# Patient Record
Sex: MALE | Race: WHITE | NOT HISPANIC OR LATINO | Employment: FULL TIME | URBAN - METROPOLITAN AREA
[De-identification: names, ages, dates, MRNs, and addresses within clinical notes are randomized per-mention and may not be internally consistent; named-entity substitution may affect disease eponyms.]

---

## 2023-08-30 PROBLEM — E78.00 HYPERCHOLESTEROLEMIA: Status: ACTIVE | Noted: 2022-08-15

## 2023-08-30 PROBLEM — I10 HYPERTENSION: Status: ACTIVE | Noted: 2023-08-30

## 2023-08-30 PROBLEM — E11.9 DIABETES (MULTI): Status: ACTIVE | Noted: 2022-08-15

## 2023-08-30 PROBLEM — F10.10 EXCESSIVE CONSUMPTION OF ETHANOL: Status: ACTIVE | Noted: 2023-08-30

## 2023-08-30 PROBLEM — E10.65 TYPE 1 DIABETES MELLITUS WITH HYPERGLYCEMIA (MULTI): Status: ACTIVE | Noted: 2023-08-30

## 2023-08-30 RX ORDER — INSULIN ASPART 100 [IU]/ML
INJECTION, SOLUTION INTRAVENOUS; SUBCUTANEOUS
COMMUNITY
Start: 2015-08-06 | End: 2024-01-25 | Stop reason: WASHOUT

## 2023-08-30 RX ORDER — INSULIN DEGLUDEC 100 U/ML
INJECTION, SOLUTION SUBCUTANEOUS
COMMUNITY
Start: 2022-11-18 | End: 2023-10-13

## 2023-08-30 RX ORDER — BLOOD-GLUCOSE METER
EACH MISCELLANEOUS
COMMUNITY
Start: 2023-01-12

## 2023-08-30 RX ORDER — INSULIN LISPRO 100 [IU]/ML
INJECTION, SOLUTION INTRAVENOUS; SUBCUTANEOUS
COMMUNITY
End: 2024-06-04

## 2023-08-30 RX ORDER — ATORVASTATIN CALCIUM 10 MG/1
TABLET, FILM COATED ORAL
COMMUNITY
Start: 2023-02-24 | End: 2023-11-07

## 2023-08-30 RX ORDER — LISINOPRIL 10 MG/1
TABLET ORAL
COMMUNITY
Start: 2023-01-12

## 2023-10-13 DIAGNOSIS — E10.65 TYPE 1 DIABETES MELLITUS WITH HYPERGLYCEMIA (MULTI): Primary | ICD-10-CM

## 2023-10-13 RX ORDER — INSULIN DEGLUDEC 100 U/ML
INJECTION, SOLUTION SUBCUTANEOUS
Qty: 30 ML | Refills: 3 | Status: SHIPPED | OUTPATIENT
Start: 2023-10-13

## 2023-11-07 DIAGNOSIS — E78.00 HYPERCHOLESTEROLEMIA: Primary | ICD-10-CM

## 2023-11-07 RX ORDER — ATORVASTATIN CALCIUM 10 MG/1
10 TABLET, FILM COATED ORAL DAILY
Qty: 90 TABLET | Refills: 1 | Status: SHIPPED | OUTPATIENT
Start: 2023-11-07 | End: 2024-05-13

## 2024-01-07 DIAGNOSIS — E10.65 TYPE 1 DIABETES MELLITUS WITH HYPERGLYCEMIA (MULTI): ICD-10-CM

## 2024-01-08 RX ORDER — BLOOD-GLUCOSE SENSOR
EACH MISCELLANEOUS
Qty: 2 EACH | Refills: 11 | Status: SHIPPED | OUTPATIENT
Start: 2024-01-08

## 2024-01-25 ENCOUNTER — OFFICE VISIT (OUTPATIENT)
Dept: ENDOCRINOLOGY | Facility: CLINIC | Age: 54
End: 2024-01-25
Payer: COMMERCIAL

## 2024-01-25 VITALS
WEIGHT: 186 LBS | BODY MASS INDEX: 25.94 KG/M2 | HEART RATE: 82 BPM | SYSTOLIC BLOOD PRESSURE: 137 MMHG | DIASTOLIC BLOOD PRESSURE: 90 MMHG

## 2024-01-25 DIAGNOSIS — E78.00 HYPERCHOLESTEROLEMIA: Primary | ICD-10-CM

## 2024-01-25 DIAGNOSIS — E10.65 TYPE 1 DIABETES MELLITUS WITH HYPERGLYCEMIA (MULTI): ICD-10-CM

## 2024-01-25 DIAGNOSIS — R53.83 OTHER FATIGUE: ICD-10-CM

## 2024-01-25 DIAGNOSIS — I10 PRIMARY HYPERTENSION: ICD-10-CM

## 2024-01-25 LAB — POC HEMOGLOBIN A1C: 8.2 % (ref 4.2–6.5)

## 2024-01-25 PROCEDURE — 99214 OFFICE O/P EST MOD 30 MIN: CPT | Performed by: NURSE PRACTITIONER

## 2024-01-25 PROCEDURE — 95251 CONT GLUC MNTR ANALYSIS I&R: CPT | Performed by: NURSE PRACTITIONER

## 2024-01-25 PROCEDURE — 1036F TOBACCO NON-USER: CPT | Performed by: NURSE PRACTITIONER

## 2024-01-25 PROCEDURE — 4010F ACE/ARB THERAPY RXD/TAKEN: CPT | Performed by: NURSE PRACTITIONER

## 2024-01-25 PROCEDURE — 3080F DIAST BP >= 90 MM HG: CPT | Performed by: NURSE PRACTITIONER

## 2024-01-25 PROCEDURE — 83036 HEMOGLOBIN GLYCOSYLATED A1C: CPT | Performed by: NURSE PRACTITIONER

## 2024-01-25 PROCEDURE — 3075F SYST BP GE 130 - 139MM HG: CPT | Performed by: NURSE PRACTITIONER

## 2024-01-25 ASSESSMENT — PATIENT HEALTH QUESTIONNAIRE - PHQ9
1. LITTLE INTEREST OR PLEASURE IN DOING THINGS: NOT AT ALL
2. FEELING DOWN, DEPRESSED OR HOPELESS: NOT AT ALL
SUM OF ALL RESPONSES TO PHQ9 QUESTIONS 1 AND 2: 0

## 2024-01-25 ASSESSMENT — ENCOUNTER SYMPTOMS
DEPRESSION: 0
OCCASIONAL FEELINGS OF UNSTEADINESS: 0
LOSS OF SENSATION IN FEET: 0

## 2024-01-25 ASSESSMENT — PAIN SCALES - GENERAL: PAINLEVEL: 0-NO PAIN

## 2024-01-25 NOTE — PATIENT INSTRUCTIONS
INCREASE TRESIBA 20 UNITS    ICR 1:8 FROM 1:10     FASTING LABS IF YOU DECIDE TO GET PUMP NEED DONE

## 2024-01-25 NOTE — PROGRESS NOTES
HPI   Presents for follow up/metabolic management 52 yo with DM Type 1 diagnosed age 39. History HTN, HLD. Current A1C 8.2%. Patient testing sugars 6 times day. No lows. Following carb controlled diet somewhat and know reasonable carb allowances. Patient able to afford their medications. Patient is exercising.    -CGM [place] with phone janessa. Currently on insulin checking BS at least 4 xs a day adjustments made based on readings/frequent visits to manage.  -INSULIN Tresiba 18 units increased to 20 this visit Humalog ICR 1:10 changed 1:8 ISF 30 >130  -HTN Lisinopril 10 mg daily at goal  -STATIN Atorvastatin 10 mg LDLCALC 74     [place] report downloaded and attached time in target 40%, 0% lows, avg bs 211. Roller coaster type pattern.       Current Outpatient Medications:     atorvastatin (Lipitor) 10 mg tablet, TAKE 1 TABLET BY MOUTH EVERY DAY, Disp: 90 tablet, Rfl: 1    blood sugar diagnostic (OneTouch Verio test strips) strip, as directed to check blood sugar daily In Vitro for 90 days, Disp: , Rfl:     blood-glucose sensor (Cuffed and WantedStyle [place] 3 Sensor) device, AS DIRECTED CHANGE SENSOR EVERY 14 DAYS 28 DAYS, Disp: 2 each, Rfl: 11    insulin degludec (Tresiba FlexTouch U-100) 100 unit/mL (3 mL) injection, AS DIRECTED, START 18 UNITS DAILY SUBCUTANEOUS 2 BOXES 90 DAYS, Disp: 30 mL, Rfl: 3    insulin lispro (HumaLOG) 100 unit/mL injection, USE AS DIRECTED THREE TIMES DAILY UP TO 30 UNITS DAILY for 100, Disp: , Rfl:     insulin lispro (HumaLOG) 100 UNIT/ML patient supplied pump, 0 Refill(s), Type: Maintenance, Disp: , Rfl:     lisinopril 10 mg tablet, 1 tablet Orally Once a day for 90 days, Disp: , Rfl:     insulin aspart (NovoLOG) 100 unit/mL injection, NovoLOG 100 UNIT/ML SOLN  Refills: 0     Rommel aTylor MD   Start : 6-Aug-2015  Active, Disp: , Rfl:       Allergies as of 01/25/2024 - Reviewed 01/25/2024   Allergen Reaction Noted    Penicillins Rash 08/30/2023         Review of Systems   Cardiology:  Lightheadedness-denies.  Chest pain-denies.  Leg edema-denies.  Palpitations-denies.  Respiratory: Cough-denies. Shortness of breath-denies.  Wheezing-denies.  Gastroenterology: Constipation-denies.  Diarrhea-denies.  Heartburn-denies.  Endocrinology: Cold intolerance-denies.  Heat intolerance-denies.  Sweats-denies.  Neurology: Headache-denies.  Tremor-denies.  Neuropathy in extremities-denies.  Psychology: Low energy-denies.  Irritability-denies.  Sleep disturbances-denies.      /90 (BP Location: Right arm, Patient Position: Sitting)   Pulse 82   Wt 84.4 kg (186 lb)   BMI 25.94 kg/m²       Labs:  Lab Results   Component Value Date    WBC 5.5 05/20/2022    NRBC 0 05/20/2022    RBC 4.45 (L) 05/20/2022    HGB 13.9 05/20/2022    HCT 39.9 (L) 05/20/2022     05/20/2022     Lab Results   Component Value Date    CALCIUM 9.6 02/02/2023    AST 21 05/20/2022    ALKPHOS 73 05/20/2022    BILITOT 0.6 05/20/2022    PROT 6.6 05/20/2022    ALBUMIN 4.4 05/20/2022    GLOB 2.2 05/20/2022    AGR 2.0 05/20/2022     02/02/2023    K 4.6 02/02/2023    CL 97 02/02/2023    CO2 26 02/02/2023    ANIONGAP 14 02/02/2023    BUN 16 02/02/2023    CREATININE 0.7 02/02/2023    UREACREAUR 22.9 (H) 02/02/2023    GLUCOSE 242 (H) 02/02/2023    ALT 16 05/20/2022    EGFR 110 02/02/2023     Lab Results   Component Value Date    CHOL 192 05/20/2022    TRIG 145 05/20/2022    HDL 89 05/20/2022    LDLCALC 74 05/20/2022     Lab Results   Component Value Date    MICROALBCREA 58.3 (H) 05/20/2022       Lab Results   Component Value Date    HGBA1C 8.2 (A) 01/25/2024         Physical Exam   General Appearance: pleasant, cooperative, no acute distress  HEENT: no chemosis, no proptosis, no lid lag, no lid retraction  Neck: no lymphadenopathy, no thyromegaly, no dominant thyroid nodules  Heart: no murmurs, regular rate and rhythm, S1 and S2  Lungs: no wheezes, no rhonci, no rales  Extremities: no lower extremity swelling      Assessment/Plan   1.  Type 1 diabetes mellitus with hyperglycemia (CMS/[facility])  -will call Tadeo Georges regarding insulin pump  -ICR changed 1:8  -basal increased 20 units daily  -reviewed target bs    2. Hypercholesterolemia  -LDL target < 70  -due for fasting lipid  -tolerates statin    3. Primary hypertension  -at target     Follow Up: 3 months CNP    -labs/tests/notes reviewed  -reviewed and counseled patient on medication monitoring and side effects    Medical Decision Making  Complexity of problem: Chronic illness of diabetes mellitus uncontrolled, progressing  Data analyzed and reviewed: Reviewed prior notes, blood glucose data, labs including HgbA1c, lipids, serum chemistries.  Ordered tests.   Risk of complications and morbidities: Is definite because of use of insulin and risk of hypoglycemia.  Prescription medications reviewed and modifications made.  Compliance assessed.  Addressed social determinants of health including food insecurity.

## 2024-04-30 ENCOUNTER — APPOINTMENT (OUTPATIENT)
Dept: ENDOCRINOLOGY | Facility: CLINIC | Age: 54
End: 2024-04-30
Payer: COMMERCIAL

## 2024-05-11 DIAGNOSIS — E78.00 HYPERCHOLESTEROLEMIA: ICD-10-CM

## 2024-05-13 RX ORDER — ATORVASTATIN CALCIUM 10 MG/1
10 TABLET, FILM COATED ORAL DAILY
Qty: 90 TABLET | Refills: 1 | Status: SHIPPED | OUTPATIENT
Start: 2024-05-13

## 2024-06-03 ENCOUNTER — APPOINTMENT (OUTPATIENT)
Dept: ENDOCRINOLOGY | Facility: CLINIC | Age: 54
End: 2024-06-03
Payer: COMMERCIAL

## 2024-06-04 DIAGNOSIS — E10.65 TYPE 1 DIABETES MELLITUS WITH HYPERGLYCEMIA (MULTI): Primary | ICD-10-CM

## 2024-06-04 RX ORDER — INSULIN LISPRO 100 [IU]/ML
INJECTION, SOLUTION INTRAVENOUS; SUBCUTANEOUS
Qty: 60 ML | Refills: 3 | Status: SHIPPED | OUTPATIENT
Start: 2024-06-04

## 2024-06-08 DIAGNOSIS — E10.65 TYPE 1 DIABETES MELLITUS WITH HYPERGLYCEMIA (MULTI): ICD-10-CM

## 2024-06-08 RX ORDER — INSULIN DEGLUDEC 100 U/ML
INJECTION, SOLUTION SUBCUTANEOUS
Refills: 0 | Status: CANCELLED | OUTPATIENT
Start: 2024-06-08

## 2024-06-10 RX ORDER — INSULIN DEGLUDEC 200 U/ML
INJECTION, SOLUTION SUBCUTANEOUS
Qty: 18 ML | Refills: 3 | Status: SHIPPED | OUTPATIENT
Start: 2024-06-10

## 2024-06-20 DIAGNOSIS — E10.65 TYPE 1 DIABETES MELLITUS WITH HYPERGLYCEMIA (MULTI): ICD-10-CM

## 2024-06-20 RX ORDER — BLOOD-GLUCOSE SENSOR
EACH MISCELLANEOUS
Qty: 6 EACH | Refills: 3 | Status: SHIPPED | OUTPATIENT
Start: 2024-06-20

## 2024-07-02 ENCOUNTER — LAB (OUTPATIENT)
Dept: LAB | Facility: LAB | Age: 54
End: 2024-07-02
Payer: COMMERCIAL

## 2024-07-02 DIAGNOSIS — E10.65 TYPE 1 DIABETES MELLITUS WITH HYPERGLYCEMIA (MULTI): ICD-10-CM

## 2024-07-02 DIAGNOSIS — R53.83 OTHER FATIGUE: ICD-10-CM

## 2024-07-02 DIAGNOSIS — E78.00 HYPERCHOLESTEROLEMIA: ICD-10-CM

## 2024-07-02 LAB
ALBUMIN SERPL-MCNC: 4.6 G/DL (ref 3.5–5)
ALP BLD-CCNC: 64 U/L (ref 35–125)
ALT SERPL-CCNC: 18 U/L (ref 5–40)
ANION GAP SERPL CALC-SCNC: 13 MMOL/L
AST SERPL-CCNC: 22 U/L (ref 5–40)
BILIRUB SERPL-MCNC: 1 MG/DL (ref 0.1–1.2)
BUN SERPL-MCNC: 9 MG/DL (ref 8–25)
CALCIUM SERPL-MCNC: 9.7 MG/DL (ref 8.5–10.4)
CHLORIDE SERPL-SCNC: 99 MMOL/L (ref 97–107)
CHOLEST SERPL-MCNC: 220 MG/DL (ref 133–200)
CHOLEST/HDLC SERPL: 2.1 {RATIO}
CO2 SERPL-SCNC: 26 MMOL/L (ref 24–31)
CREAT SERPL-MCNC: 0.8 MG/DL (ref 0.4–1.6)
EGFRCR SERPLBLD CKD-EPI 2021: >90 ML/MIN/1.73M*2
GLUCOSE SERPL-MCNC: 170 MG/DL (ref 65–99)
HDLC SERPL-MCNC: 107 MG/DL
LDLC SERPL CALC-MCNC: 101 MG/DL (ref 65–130)
POTASSIUM SERPL-SCNC: 4.9 MMOL/L (ref 3.4–5.1)
PROT SERPL-MCNC: 7.3 G/DL (ref 5.9–7.9)
SODIUM SERPL-SCNC: 138 MMOL/L (ref 133–145)
TRIGL SERPL-MCNC: 62 MG/DL (ref 40–150)
TSH SERPL DL<=0.05 MIU/L-ACNC: 1.58 MIU/L (ref 0.27–4.2)

## 2024-07-02 PROCEDURE — 84681 ASSAY OF C-PEPTIDE: CPT

## 2024-07-02 PROCEDURE — 80053 COMPREHEN METABOLIC PANEL: CPT

## 2024-07-02 PROCEDURE — 36415 COLL VENOUS BLD VENIPUNCTURE: CPT

## 2024-07-02 PROCEDURE — 84443 ASSAY THYROID STIM HORMONE: CPT

## 2024-07-02 PROCEDURE — 80061 LIPID PANEL: CPT

## 2024-07-03 ENCOUNTER — APPOINTMENT (OUTPATIENT)
Dept: ENDOCRINOLOGY | Facility: CLINIC | Age: 54
End: 2024-07-03
Payer: COMMERCIAL

## 2024-07-03 VITALS
DIASTOLIC BLOOD PRESSURE: 78 MMHG | WEIGHT: 183 LBS | HEART RATE: 74 BPM | BODY MASS INDEX: 25.52 KG/M2 | SYSTOLIC BLOOD PRESSURE: 130 MMHG

## 2024-07-03 DIAGNOSIS — E10.65 TYPE 1 DIABETES MELLITUS WITH HYPERGLYCEMIA (MULTI): Primary | ICD-10-CM

## 2024-07-03 LAB
C PEPTIDE SERPL-MCNC: 0.1 NG/ML (ref 0.7–3.9)
POC HEMOGLOBIN A1C: 8.2 % (ref 4.2–6.5)

## 2024-07-03 PROCEDURE — 3078F DIAST BP <80 MM HG: CPT | Performed by: NURSE PRACTITIONER

## 2024-07-03 PROCEDURE — 3049F LDL-C 100-129 MG/DL: CPT | Performed by: NURSE PRACTITIONER

## 2024-07-03 PROCEDURE — 1036F TOBACCO NON-USER: CPT | Performed by: NURSE PRACTITIONER

## 2024-07-03 PROCEDURE — 3075F SYST BP GE 130 - 139MM HG: CPT | Performed by: NURSE PRACTITIONER

## 2024-07-03 PROCEDURE — 99213 OFFICE O/P EST LOW 20 MIN: CPT | Performed by: NURSE PRACTITIONER

## 2024-07-03 PROCEDURE — 4010F ACE/ARB THERAPY RXD/TAKEN: CPT | Performed by: NURSE PRACTITIONER

## 2024-07-03 PROCEDURE — 83036 HEMOGLOBIN GLYCOSYLATED A1C: CPT | Performed by: NURSE PRACTITIONER

## 2024-07-03 PROCEDURE — 95251 CONT GLUC MNTR ANALYSIS I&R: CPT | Performed by: NURSE PRACTITIONER

## 2024-07-03 RX ORDER — ATORVASTATIN CALCIUM 20 MG/1
20 TABLET, FILM COATED ORAL DAILY
Qty: 90 TABLET | Refills: 3 | Status: SHIPPED | OUTPATIENT
Start: 2024-07-03

## 2024-07-03 ASSESSMENT — LIFESTYLE VARIABLES
HOW MANY STANDARD DRINKS CONTAINING ALCOHOL DO YOU HAVE ON A TYPICAL DAY: 3 OR 4
HOW OFTEN DO YOU HAVE A DRINK CONTAINING ALCOHOL: 4 OR MORE TIMES A WEEK

## 2024-07-03 ASSESSMENT — PATIENT HEALTH QUESTIONNAIRE - PHQ9
1. LITTLE INTEREST OR PLEASURE IN DOING THINGS: NOT AT ALL
2. FEELING DOWN, DEPRESSED OR HOPELESS: NOT AT ALL
SUM OF ALL RESPONSES TO PHQ9 QUESTIONS 1 & 2: 0

## 2024-07-03 ASSESSMENT — PAIN SCALES - GENERAL: PAINLEVEL: 0-NO PAIN

## 2024-07-03 NOTE — PATIENT INSTRUCTIONS
DINNER OR HIGHER CARBS TRY 1:6 INSTEAD OF 1:8    FINISH THE ATORVASTATIN WILEY HAVE BY TAKING 2 PILLS AT NIGHT

## 2024-07-03 NOTE — PROGRESS NOTES
HPI   Presents for follow up/metabolic management 55 yo with DM Type 1 diagnosed age 39. History HTN, HLD. Current A1C 8.2%. Patient testing sugars 6 times day. No lows. Following carb controlled diet somewhat and know reasonable carb allowances. Patient able to afford their medications. Patient is exercising.    -CGM [place] with phone janessa. Currently on insulin checking BS at least 4 xs a day adjustments made based on readings/frequent visits to manage.  -INSULIN Tresiba 20 units Humalog ICR 1:8 ISF 30 >130 try 1:6 for dinner/higher carb  -HTN Lisinopril 10 mg daily at goal  -STATIN Atorvastatin 10 mg  increased to 20 mg this visit    [place] report downloaded and attached time in target 40%, 0% lows, avg bs 211. Roller coaster type pattern.       Current Outpatient Medications:     atorvastatin (Lipitor) 10 mg tablet, TAKE 1 TABLET BY MOUTH EVERY DAY, Disp: 90 tablet, Rfl: 1    blood sugar diagnostic (OneTouch Verio test strips) strip, as directed to check blood sugar daily In Vitro for 90 days, Disp: , Rfl:     blood-glucose sensor (FreeStyle [place] 3 Sensor) device, Use q 14 days, Disp: 6 each, Rfl: 3    insulin degludec (Tresiba FlexTouch U-200) 200 unit/mL (3 mL) injection, UP TO 22 UNITS DAILY, Disp: 18 mL, Rfl: 3    insulin lispro (HumaLOG) 100 unit/mL injection, UP TO 60 UNITS DAILY, Disp: 60 mL, Rfl: 3    lisinopril 10 mg tablet, 1 tablet Orally Once a day for 90 days, Disp: , Rfl:     atorvastatin (Lipitor) 20 mg tablet, Take 1 tablet (20 mg) by mouth once daily., Disp: 90 tablet, Rfl: 3    insulin degludec (Tresiba FlexTouch U-100) 100 unit/mL (3 mL) injection, AS DIRECTED, START 18 UNITS DAILY SUBCUTANEOUS 2 BOXES 90 DAYS (Patient not taking: Reported on 7/3/2024), Disp: 30 mL, Rfl: 3      Allergies as of 07/03/2024 - Reviewed 07/03/2024   Allergen Reaction Noted    Penicillins Rash 08/30/2023         Review of Systems   Cardiology: Lightheadedness-denies.  Chest pain-denies.  Leg edema-denies.   Palpitations-denies.  Respiratory: Cough-denies. Shortness of breath-denies.  Wheezing-denies.  Gastroenterology: Constipation-denies.  Diarrhea-denies.  Heartburn-denies.  Endocrinology: Cold intolerance-denies.  Heat intolerance-denies.  Sweats-denies.  Neurology: Headache-denies.  Tremor-denies.  Neuropathy in extremities-denies.  Psychology: Low energy-denies.  Irritability-denies.  Sleep disturbances-denies.      /78 (BP Location: Left arm, Patient Position: Sitting)   Pulse 74   Wt 83 kg (183 lb)   BMI 25.52 kg/m²       Labs:  Lab Results   Component Value Date    WBC 5.5 05/20/2022    NRBC 0 05/20/2022    RBC 4.45 (L) 05/20/2022    HGB 13.9 05/20/2022    HCT 39.9 (L) 05/20/2022     05/20/2022     Lab Results   Component Value Date    CALCIUM 9.7 07/02/2024    AST 22 07/02/2024    ALKPHOS 64 07/02/2024    BILITOT 1.0 07/02/2024    PROT 7.3 07/02/2024    ALBUMIN 4.6 07/02/2024    GLOB 2.2 05/20/2022    AGR 2.0 05/20/2022     07/02/2024    K 4.9 07/02/2024    CL 99 07/02/2024    CO2 26 07/02/2024    ANIONGAP 13 07/02/2024    BUN 9 07/02/2024    CREATININE 0.80 07/02/2024    UREACREAUR 22.9 (H) 02/02/2023    GLUCOSE 170 (H) 07/02/2024    ALT 18 07/02/2024    EGFR >90 07/02/2024     Lab Results   Component Value Date    CHOL 220 (H) 07/02/2024    TRIG 62 07/02/2024    .0 07/02/2024    LDLCALC 101 07/02/2024     Lab Results   Component Value Date    MICROALBCREA 58.3 (H) 05/20/2022       Lab Results   Component Value Date    HGBA1C 8.2 (A) 07/03/2024         Physical Exam   General Appearance: pleasant, cooperative, no acute distress  HEENT: no chemosis, no proptosis, no lid lag, no lid retraction  Neck: no lymphadenopathy, no thyromegaly, no dominant thyroid nodules  Heart: no murmurs, regular rate and rhythm, S1 and S2  Lungs: no wheezes, no rhonci, no rales  Extremities: no lower extremity swelling      Assessment/Plan   1. Type 1 diabetes mellitus with hyperglycemia (CMS/HCC)  -will  call Tadeo Georges regarding insulin pump  -ICR changed 1:6 for dinner/higher carbs  -reviewed target bs    2. Hypercholesterolemia  -LDL target < 70  -tolerates statin  -dose increase 20 mg     3. Primary hypertension  -at target     Follow Up: 4 months CNP    -labs/tests/notes reviewed  -reviewed and counseled patient on medication monitoring and side effects    Medical Decision Making  Complexity of problem: Chronic illness of diabetes mellitus uncontrolled, progressing  Data analyzed and reviewed: Reviewed prior notes, blood glucose data, labs including HgbA1c, lipids, serum chemistries.  Ordered tests.   Risk of complications and morbidities: Is definite because of use of insulin and risk of hypoglycemia.  Prescription medications reviewed and modifications made.  Compliance assessed.  Addressed social determinants of health including food insecurity.

## 2024-08-20 DIAGNOSIS — I10 PRIMARY HYPERTENSION: Primary | ICD-10-CM

## 2024-08-20 RX ORDER — LISINOPRIL 10 MG/1
10 TABLET ORAL DAILY
Qty: 90 TABLET | Refills: 1 | Status: SHIPPED | OUTPATIENT
Start: 2024-08-20

## 2024-10-02 ENCOUNTER — TELEPHONE (OUTPATIENT)
Dept: ENDOCRINOLOGY | Facility: CLINIC | Age: 54
End: 2024-10-02
Payer: COMMERCIAL

## 2024-10-16 DIAGNOSIS — E10.65 TYPE 1 DIABETES MELLITUS WITH HYPERGLYCEMIA (MULTI): ICD-10-CM

## 2024-10-17 RX ORDER — HYDROCHLOROTHIAZIDE 12.5 MG/1
CAPSULE ORAL
Qty: 2 EACH | Refills: 11 | Status: SHIPPED | OUTPATIENT
Start: 2024-10-17

## 2024-10-21 ENCOUNTER — TELEPHONE (OUTPATIENT)
Dept: ENDOCRINOLOGY | Facility: CLINIC | Age: 54
End: 2024-10-21
Payer: COMMERCIAL

## 2024-10-21 DIAGNOSIS — E10.65 TYPE 1 DIABETES MELLITUS WITH HYPERGLYCEMIA (MULTI): ICD-10-CM

## 2024-10-21 RX ORDER — HYDROCHLOROTHIAZIDE 12.5 MG/1
CAPSULE ORAL
Qty: 2 EACH | Refills: 11 | Status: SHIPPED | OUTPATIENT
Start: 2024-10-21

## 2024-10-21 NOTE — TELEPHONE ENCOUNTER
Trenton Bailey   1970   [id number]   [phone]       Called patient and left voice message in regards to medication Freestyle [place] needing PA. Called to see if patient received medication already.

## 2024-10-21 NOTE — TELEPHONE ENCOUNTER
We are sending your prescription to [facility] for benefits investigation and affordability support.      If you have any questions or would like to check the status of your prescription(s),  please contact the pharmacy directly at [phone].

## 2024-11-06 ENCOUNTER — APPOINTMENT (OUTPATIENT)
Dept: ENDOCRINOLOGY | Facility: CLINIC | Age: 54
End: 2024-11-06
Payer: COMMERCIAL

## 2024-11-25 ENCOUNTER — TELEPHONE (OUTPATIENT)
Dept: ENDOCRINOLOGY | Facility: CLINIC | Age: 54
End: 2024-11-25
Payer: COMMERCIAL

## 2024-11-25 DIAGNOSIS — E10.65 TYPE 1 DIABETES MELLITUS WITH HYPERGLYCEMIA (MULTI): ICD-10-CM

## 2024-11-25 PROCEDURE — RXMED WILLOW AMBULATORY MEDICATION CHARGE

## 2024-11-25 RX ORDER — HYDROCHLOROTHIAZIDE 12.5 MG/1
CAPSULE ORAL
Qty: 2 EACH | Refills: 11 | Status: SHIPPED | OUTPATIENT
Start: 2024-11-25

## 2024-11-25 NOTE — TELEPHONE ENCOUNTER
We are sending your prescription to [facility] for benefits investigation and affordability support.      If you have any questions or would like to check the status of your prescription(s),  please contact the pharmacy directly at [phone].

## 2024-11-27 ENCOUNTER — PHARMACY VISIT (OUTPATIENT)
Dept: PHARMACY | Facility: CLINIC | Age: 54
End: 2024-11-27
Payer: COMMERCIAL

## 2024-12-17 DIAGNOSIS — E10.65 TYPE 1 DIABETES MELLITUS WITH HYPERGLYCEMIA (MULTI): ICD-10-CM

## 2024-12-17 RX ORDER — BLOOD-GLUCOSE SENSOR
EACH MISCELLANEOUS
Qty: 6 EACH | Refills: 3 | Status: SHIPPED | OUTPATIENT
Start: 2024-12-17

## 2025-01-10 ENCOUNTER — APPOINTMENT (OUTPATIENT)
Dept: ENDOCRINOLOGY | Facility: CLINIC | Age: 55
End: 2025-01-10
Payer: COMMERCIAL

## 2025-02-03 DIAGNOSIS — I10 PRIMARY HYPERTENSION: ICD-10-CM

## 2025-02-03 RX ORDER — LISINOPRIL 10 MG/1
10 TABLET ORAL DAILY
Qty: 90 TABLET | Refills: 1 | Status: SHIPPED | OUTPATIENT
Start: 2025-02-03

## 2025-05-30 NOTE — PROGRESS NOTES
"HPI           Current Medications[1]      Allergies as of 06/02/2025 - Reviewed 07/03/2024   Allergen Reaction Noted    Penicillins Rash 08/30/2023         Review of Systems   Cardiology: Lightheadedness-denies.  Chest pain-denies.  Leg edema-denies.  Palpitations-denies.  Respiratory: Cough-denies. Shortness of breath-denies.  Wheezing-denies.  Gastroenterology: Constipation-denies.  Diarrhea-denies.  Heartburn-denies.  Endocrinology: Cold intolerance-denies.  Heat intolerance-denies.  Sweats-denies.  Neurology: Headache-denies.  Tremor-denies.  Neuropathy in extremities-denies.  Psychology: Low energy-denies.  Irritability-denies.  Sleep disturbances-denies.      There were no vitals taken for this visit.      Labs:  Lab Results   Component Value Date    WBC 5.5 05/20/2022    NRBC 0 05/20/2022    RBC 4.45 (L) 05/20/2022    HGB 13.9 05/20/2022    HCT 39.9 (L) 05/20/2022     05/20/2022     Lab Results   Component Value Date    CALCIUM 9.7 07/02/2024    AST 22 07/02/2024    ALKPHOS 64 07/02/2024    BILITOT 1.0 07/02/2024    PROT 7.3 07/02/2024    ALBUMIN 4.6 07/02/2024    GLOB 2.2 05/20/2022    AGR 2.0 05/20/2022     07/02/2024    K 4.9 07/02/2024    CL 99 07/02/2024    CO2 26 07/02/2024    ANIONGAP 13 07/02/2024    BUN 9 07/02/2024    CREATININE 0.80 07/02/2024    UREACREAUR 22.9 (H) 02/02/2023    GLUCOSE 170 (H) 07/02/2024    ALT 18 07/02/2024    EGFR >90 07/02/2024     Lab Results   Component Value Date    CHOL 220 (H) 07/02/2024    TRIG 62 07/02/2024    .0 07/02/2024    LDLCALC 101 07/02/2024     Lab Results   Component Value Date    MICROALBCREA 58.3 (H) 05/20/2022     Lab Results   Component Value Date    TSH 1.58 07/02/2024     No results found for: \"[id number]\"  Lab Results   Component Value Date    HGBA1C 8.2 (A) 07/03/2024         Physical Exam   General Appearance: pleasant, cooperative, no acute distress  HEENT: no chemosis, no proptosis, no lid lag, no lid retraction  Neck: no " lymphadenopathy, no thyromegaly, no dominant thyroid nodules  Heart: no murmurs, regular rate and rhythm, S1 and S2  Lungs: no wheezes, no rhonci, no rales  Extremities: no lower extremity swelling      Assessment/Plan   1. Type 1 diabetes mellitus with hyperglycemia (Multi) (Primary)  ***    2. Hypercholesterolemia  ***    3. Primary hypertension  ***    4. Other fatigue  ***         Follow Up:      -labs/tests/notes reviewed  -reviewed and counseled patient on medication monitoring and side effects               [1]   Current Outpatient Medications:     atorvastatin (Lipitor) 10 mg tablet, TAKE 1 TABLET BY MOUTH EVERY DAY, Disp: 90 tablet, Rfl: 1    atorvastatin (Lipitor) 20 mg tablet, Take 1 tablet (20 mg) by mouth once daily., Disp: 90 tablet, Rfl: 3    blood sugar diagnostic (OneTouch Verio test strips) strip, as directed to check blood sugar daily In Vitro for 90 days, Disp: , Rfl:     blood-glucose sensor (FreeStyle [place] 3 Plus Sensor) device, Change every 15 days as directed, Disp: 6 each, Rfl: 3    insulin degludec (Tresiba FlexTouch U-200) 200 unit/mL (3 mL) injection, UP TO 22 UNITS DAILY, Disp: 18 mL, Rfl: 3    insulin lispro (HumaLOG) 100 unit/mL injection, UP TO 60 UNITS DAILY, Disp: 60 mL, Rfl: 3    lisinopril 10 mg tablet, Take 1 tablet (10 mg) by mouth once daily., Disp: 90 tablet, Rfl: 1

## 2025-06-02 ENCOUNTER — APPOINTMENT (OUTPATIENT)
Dept: ENDOCRINOLOGY | Facility: CLINIC | Age: 55
End: 2025-06-02
Payer: COMMERCIAL

## 2025-06-02 DIAGNOSIS — R53.83 OTHER FATIGUE: ICD-10-CM

## 2025-06-02 DIAGNOSIS — E10.65 TYPE 1 DIABETES MELLITUS WITH HYPERGLYCEMIA (MULTI): Primary | ICD-10-CM

## 2025-06-02 DIAGNOSIS — E78.00 HYPERCHOLESTEROLEMIA: ICD-10-CM

## 2025-06-02 DIAGNOSIS — I10 PRIMARY HYPERTENSION: ICD-10-CM

## 2025-06-11 ENCOUNTER — APPOINTMENT (OUTPATIENT)
Dept: PRIMARY CARE | Facility: CLINIC | Age: 55
End: 2025-06-11
Payer: COMMERCIAL

## 2025-08-01 DIAGNOSIS — I10 PRIMARY HYPERTENSION: ICD-10-CM

## 2025-08-01 RX ORDER — LISINOPRIL 10 MG/1
10 TABLET ORAL DAILY
Qty: 90 TABLET | Refills: 3 | Status: SHIPPED | OUTPATIENT
Start: 2025-08-01

## 2025-08-06 DIAGNOSIS — E10.65 TYPE 1 DIABETES MELLITUS WITH HYPERGLYCEMIA (MULTI): ICD-10-CM

## 2025-08-06 RX ORDER — ATORVASTATIN CALCIUM 20 MG/1
20 TABLET, FILM COATED ORAL DAILY
Qty: 90 TABLET | Refills: 3 | Status: SHIPPED | OUTPATIENT
Start: 2025-08-06

## 2025-08-22 DIAGNOSIS — E10.65 TYPE 1 DIABETES MELLITUS WITH HYPERGLYCEMIA (MULTI): ICD-10-CM

## 2025-08-22 RX ORDER — INSULIN LISPRO 100 [IU]/ML
INJECTION, SOLUTION INTRAVENOUS; SUBCUTANEOUS
Qty: 60 ML | Refills: 3 | Status: SHIPPED | OUTPATIENT
Start: 2025-08-22

## 2025-10-07 ENCOUNTER — APPOINTMENT (OUTPATIENT)
Facility: CLINIC | Age: 55
End: 2025-10-07
Payer: COMMERCIAL